# Patient Record
Sex: FEMALE | Race: WHITE | NOT HISPANIC OR LATINO | Employment: FULL TIME | URBAN - NONMETROPOLITAN AREA
[De-identification: names, ages, dates, MRNs, and addresses within clinical notes are randomized per-mention and may not be internally consistent; named-entity substitution may affect disease eponyms.]

---

## 2017-02-24 ENCOUNTER — OFFICE VISIT (OUTPATIENT)
Dept: CARDIOLOGY | Facility: CLINIC | Age: 62
End: 2017-02-24

## 2017-02-24 ENCOUNTER — APPOINTMENT (OUTPATIENT)
Dept: LAB | Facility: HOSPITAL | Age: 62
End: 2017-02-24

## 2017-02-24 VITALS
DIASTOLIC BLOOD PRESSURE: 81 MMHG | SYSTOLIC BLOOD PRESSURE: 121 MMHG | HEIGHT: 62 IN | WEIGHT: 129.2 LBS | HEART RATE: 78 BPM | BODY MASS INDEX: 23.77 KG/M2

## 2017-02-24 DIAGNOSIS — R93.1 ELEVATED CORONARY ARTERY CALCIUM SCORE: Primary | ICD-10-CM

## 2017-02-24 DIAGNOSIS — E78.5 HYPERLIPIDEMIA LDL GOAL <100: ICD-10-CM

## 2017-02-24 LAB
ALBUMIN SERPL-MCNC: 4 G/DL (ref 3.2–4.8)
ALBUMIN/GLOB SERPL: 1.4 G/DL (ref 1.5–2.5)
ALP SERPL-CCNC: 57 U/L (ref 25–100)
ALT SERPL W P-5'-P-CCNC: 19 U/L (ref 7–40)
ANION GAP SERPL CALCULATED.3IONS-SCNC: 6 MMOL/L (ref 3–11)
ARTICHOKE IGE QN: 144 MG/DL (ref 0–130)
AST SERPL-CCNC: 27 U/L (ref 0–33)
BILIRUB SERPL-MCNC: 0.6 MG/DL (ref 0.3–1.2)
BUN BLD-MCNC: 12 MG/DL (ref 9–23)
BUN/CREAT SERPL: 15 (ref 7–25)
CALCIUM SPEC-SCNC: 9.5 MG/DL (ref 8.7–10.4)
CHLORIDE SERPL-SCNC: 105 MMOL/L (ref 99–109)
CHOLEST SERPL-MCNC: 236 MG/DL (ref 0–200)
CO2 SERPL-SCNC: 28 MMOL/L (ref 20–31)
CREAT BLD-MCNC: 0.8 MG/DL (ref 0.6–1.3)
GFR SERPL CREATININE-BSD FRML MDRD: 73 ML/MIN/1.73
GLOBULIN UR ELPH-MCNC: 2.9 GM/DL
GLUCOSE BLD-MCNC: 77 MG/DL (ref 70–100)
HDLC SERPL-MCNC: 80 MG/DL (ref 40–60)
POTASSIUM BLD-SCNC: 4.7 MMOL/L (ref 3.5–5.5)
PROT SERPL-MCNC: 6.9 G/DL (ref 5.7–8.2)
SODIUM BLD-SCNC: 139 MMOL/L (ref 132–146)
TRIGL SERPL-MCNC: 95 MG/DL (ref 0–150)

## 2017-02-24 PROCEDURE — 36415 COLL VENOUS BLD VENIPUNCTURE: CPT | Performed by: INTERNAL MEDICINE

## 2017-02-24 PROCEDURE — 80053 COMPREHEN METABOLIC PANEL: CPT | Performed by: INTERNAL MEDICINE

## 2017-02-24 PROCEDURE — 80061 LIPID PANEL: CPT | Performed by: INTERNAL MEDICINE

## 2017-02-24 PROCEDURE — 99213 OFFICE O/P EST LOW 20 MIN: CPT | Performed by: INTERNAL MEDICINE

## 2017-02-24 NOTE — PROGRESS NOTES
Encounter Date:02/24/2017    Patient ID: Delia Morejon is a 61 y.o. female who is formerly from [place] but now resides in [place], [place].    CC/Reason for visit:  Elevated calcium score (6 month follow up) and Hyperlipidemia          Delia Morejon is completely asymptomatic for angina, heart failure, or palpitation symptoms.  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  She complains of easy bruising since starting Crestor twice a week.      Review of Systems   Constitution: Negative for weakness and malaise/fatigue.   Eyes: Negative for vision loss in left eye and vision loss in right eye.   Cardiovascular: Negative for chest pain, dyspnea on exertion, near-syncope, orthopnea, palpitations, paroxysmal nocturnal dyspnea and syncope.   Hematologic/Lymphatic: Bruises/bleeds easily.   Musculoskeletal: Positive for back pain. Negative for myalgias.   Genitourinary: Positive for flank pain.   Neurological: Negative for brief paralysis, excessive daytime sleepiness, focal weakness, numbness and paresthesias.   All other systems reviewed and are negative.      The patient's past medical, social, and family history reviewed in the patient's electronic medical record.    Allergies  Latex; Penicillins; and Statins    Outpatient Prescriptions Marked as Taking for the 2/24/17 encounter (Office Visit) with Jluis Conte MD   Medication Sig Dispense Refill   • Apoaequorin (PREVAGEN PO) Take  by mouth Daily.     • Cholecalciferol (VITAMIN D3) 2000 UNITS tablet Take 1 tablet by mouth 2 (two) times a day.     • Coenzyme Q10 (CO Q 10) 100 MG capsule Take  by mouth daily.     • fexofenadine (ALLEGRA) 180 MG tablet Take 180 mg by mouth daily.     • Magnesium 200 MG tablet Take  by mouth daily.     • Magnesium Carbonate, Antacid, granules Take  by mouth as needed.     • Omega-3 Fatty Acids (FISH OIL) 1000 MG capsule capsule Take 1,000 mg by mouth daily with  "breakfast.     • Red Yeast Rice Extract 600 MG tablet Take  by mouth Daily. Alternates with Crestor           Blood pressure 121/81, pulse 78, height 62\" (157.5 cm), weight 129 lb 3.2 oz (58.6 kg).  Body mass index is 23.63 kg/(m^2).    Physical Exam   Constitutional: She is oriented to person, place, and time. She appears well-developed and well-nourished.   HENT:   Head: Normocephalic and atraumatic.   Eyes: Pupils are equal, round, and reactive to light. No scleral icterus.   Neck: No JVD present. Carotid bruit is not present. No thyromegaly present.   Cardiovascular: Normal rate, regular rhythm, S1 normal and S2 normal.  Exam reveals no gallop.    No murmur heard.  Pulmonary/Chest: Effort normal and breath sounds normal.   Neurological: She is alert and oriented to person, place, and time.   Skin: Skin is warm and dry. No cyanosis. Nails show no clubbing.   Psychiatric: She has a normal mood and affect. Her behavior is normal.       Data Review:     Lab Results   Component Value Date    CHOL 283 (H) 09/26/2016    TRIG 95 09/26/2016    HDL 93 (H) 09/26/2016    LDLDIRECT 151 (H) 09/26/2016    AST 23 09/26/2016    ALT 25 09/26/2016       Lab Results   Component Value Date    GLUCOSE 91 09/26/2016    BUN 14 09/26/2016    CREATININE 0.80 09/26/2016    EGFRIFNONA 73 09/26/2016    BCR 17.5 09/26/2016    K 3.9 09/26/2016    CO2 30.0 09/26/2016    CALCIUM 9.3 09/26/2016    ALBUMIN 4.20 09/26/2016    LABIL2 1.4 09/26/2016    AST 23 09/26/2016    ALT 25 09/26/2016       Procedures         Problem List Items Addressed This Visit        Cardiology Problems    Elevated coronary artery calcium score - Primary    Overview     · Calcium score of 23 with focal LAD disease         Current Assessment & Plan     · Statin therapy for heart attack prevention recommended.         Hyperlipidemia LDL goal <100    Overview     · Statin therapy indicated given the presence of coronary artery disease         Relevant Orders    Comprehensive " Metabolic Panel    Lipid Panel               · Continue Crestor as tolerated  · CMP and lipids today  · Follow-up with me in one year or as needed    Jluis Conte MD  2/24/2017    Scribed for Jluis Conte MD by Chau Bhardwaj. 2/24/2017  10:06 AM    I, Jluis Conte MD, personally performed the services described in this documentation as scribed by the above named individual in my presence, and it is both accurate and complete.  2/28/2017  9:27 AM

## 2024-12-05 ENCOUNTER — HOSPITAL ENCOUNTER (OUTPATIENT)
Dept: ULTRASOUND IMAGING | Facility: HOSPITAL | Age: 69
Discharge: HOME | End: 2024-12-05
Payer: COMMERCIAL

## 2024-12-05 ENCOUNTER — HOSPITAL ENCOUNTER (OUTPATIENT)
Dept: MAMMOGRAPHY | Facility: HOSPITAL | Age: 69
Discharge: HOME | End: 2024-12-05
Payer: COMMERCIAL

## 2024-12-05 DIAGNOSIS — R22.30 AXILLARY MASS, UNSPECIFIED LATERALITY: ICD-10-CM

## 2024-12-05 PROCEDURE — G0279 TOMOSYNTHESIS, MAMMO: HCPCS

## 2024-12-05 PROCEDURE — 76642 ULTRASOUND BREAST LIMITED: CPT

## 2024-12-05 PROCEDURE — 77066 DX MAMMO INCL CAD BI: CPT

## 2024-12-10 ENCOUNTER — TRANSCRIBE ORDERS (OUTPATIENT)
Dept: ADMINISTRATIVE | Facility: HOSPITAL | Age: 69
End: 2024-12-10
Payer: COMMERCIAL

## 2024-12-10 DIAGNOSIS — R92.0 MAMMOGRAPHIC MICROCALCIFICATION: Primary | ICD-10-CM
